# Patient Record
Sex: FEMALE | HISPANIC OR LATINO | URBAN - NONMETROPOLITAN AREA
[De-identification: names, ages, dates, MRNs, and addresses within clinical notes are randomized per-mention and may not be internally consistent; named-entity substitution may affect disease eponyms.]

---

## 2023-08-21 ENCOUNTER — OFFICE VISIT (OUTPATIENT)
Dept: OBGYN CLINIC | Facility: CLINIC | Age: 33
End: 2023-08-21
Payer: COMMERCIAL

## 2023-08-21 ENCOUNTER — HOSPITAL ENCOUNTER (OUTPATIENT)
Dept: ULTRASOUND IMAGING | Facility: HOSPITAL | Age: 33
Discharge: HOME | End: 2023-08-21
Attending: OBSTETRICS & GYNECOLOGY
Payer: COMMERCIAL

## 2023-08-21 VITALS
SYSTOLIC BLOOD PRESSURE: 118 MMHG | DIASTOLIC BLOOD PRESSURE: 72 MMHG | HEIGHT: 62 IN | WEIGHT: 150 LBS | BODY MASS INDEX: 27.6 KG/M2

## 2023-08-21 DIAGNOSIS — Z97.5 IUD (INTRAUTERINE DEVICE) IN PLACE: ICD-10-CM

## 2023-08-21 DIAGNOSIS — Z01.419 ENCOUNTER FOR GYNECOLOGICAL EXAMINATION: Primary | ICD-10-CM

## 2023-08-21 DIAGNOSIS — N92.6 IRREGULAR BLEEDING: ICD-10-CM

## 2023-08-21 LAB — SL AMB POCT URINE HCG: NEGATIVE

## 2023-08-21 PROCEDURE — 99385 PREV VISIT NEW AGE 18-39: CPT | Performed by: OBSTETRICS & GYNECOLOGY

## 2023-08-21 PROCEDURE — G0145 SCR C/V CYTO,THINLAYER,RESCR: HCPCS | Performed by: OBSTETRICS & GYNECOLOGY

## 2023-08-21 PROCEDURE — G0476 HPV COMBO ASSAY CA SCREEN: HCPCS | Performed by: OBSTETRICS & GYNECOLOGY

## 2023-08-21 PROCEDURE — 81025 URINE PREGNANCY TEST: CPT | Performed by: OBSTETRICS & GYNECOLOGY

## 2023-08-21 PROCEDURE — 76856 US EXAM PELVIC COMPLETE: CPT

## 2023-08-21 PROCEDURE — 76830 TRANSVAGINAL US NON-OB: CPT

## 2023-08-21 RX ORDER — HYDROXYZINE HYDROCHLORIDE 25 MG/1
TABLET, FILM COATED ORAL
COMMUNITY
Start: 2023-06-25

## 2023-08-21 RX ORDER — PREDNISONE 10 MG/1
TABLET ORAL
COMMUNITY
Start: 2023-06-25

## 2023-08-21 RX ORDER — TRIAMCINOLONE ACETONIDE 5 MG/G
1 CREAM TOPICAL 2 TIMES DAILY
COMMUNITY
Start: 2023-06-30

## 2023-08-21 NOTE — PROGRESS NOTES
ASSESSMENT & PLAN: Sumanth Valentin is a 28 y.o. [id number] with normal gynecologic exam.    1.  Routine well woman exam done today  2. Pap and HPV:  The patient's last pap and hpv was one year ago in   . It was normal.    Pap and cotesting was done today. Current ASCCP Guidelines reviewed. - no records from     3. The following were reviewed in today's visit: breast self exam, family planning choices, exercise and healthy diet. 4.PAragard IUD - placed in  3 years ago , noticing irregular cycles and bleeding     CC: Annual Gynecologic Examination    HPI: Sumanth Valentin is a 28 y.o. [id number] who presents for annual gynecologic examination. She has the following concerns:  IUD in place for  3 years / has seen her cycles are mostly spotting at irregular times and other times has a normal cycle     Health Maintenance:    She wears her seatbelt routinely. She does perform regular monthly self breast exams. She feels safe at home. History reviewed. No pertinent past medical history. Past Surgical History:   Procedure Laterality Date   • ABDOMINOPLASTY     • BUTTOCK LIFT     •  SECTION         Past OB/Gyn History:  OB History        3    Para   3    Term   3            AB        Living   3       SAB        IAB        Ectopic        Multiple        Live Births   3                   History reviewed. No pertinent family history. Social History:  Social History     Socioeconomic History   • Marital status: Single     Spouse name: Not on file   • Number of children: Not on file   • Years of education: Not on file   • Highest education level: Not on file   Occupational History   • Not on file   Tobacco Use   • Smoking status: Never   • Smokeless tobacco: Never   Vaping Use   • Vaping Use: Never used   Substance and Sexual Activity   • Alcohol use: Yes     Comment: occ   • Drug use: Never   • Sexual activity: Yes     Partners: Male     Birth control/protection: I.U.D.    Other Topics Concern   • Not on file   Social History Narrative   • Not on file     Social Determinants of Health     Financial Resource Strain: Not on file   Food Insecurity: Not on file   Transportation Needs: Not on file   Physical Activity: Not on file   Stress: Not on file   Social Connections: Not on file   Intimate Partner Violence: Not on file   Housing Stability: Not on file       No Known Allergies      Current Outpatient Medications:   •  hydrOXYzine HCL (ATARAX) 25 mg tablet, take 1 tablet by mouth three times a day if needed for itching, Disp: , Rfl:   •  predniSONE 10 mg tablet, TAKE 5 TABLETS BY MOUTH DAILY FOR 2 days THEN TAKE 4 TABLETS BY M...  (REFER TO PRESCRIPTION NOTES). , Disp: , Rfl:   •  triamcinolone (KENALOG) 0.5 % cream, Apply 1 Application topically 2 (two) times a day To affected area, Disp: , Rfl:       Review of Systems  Constitutional :no fever, feels well, no tiredness, no recent weight gain or loss  ENT: no ear ache, no loss of hearing, no nosebleeds or nasal discharge, no sore throat or hoarseness. Cardiovascular: no complaints of slow or fast heart beat, no chest pain, no palpitations, no leg claudication or lower extremity edema. Respiratory: no complaints of shortness of shortness of breath, no VELAZQUEZ  Breasts:no complaints of breast pain, breast lump, or nipple discharge  Gastrointestinal: no complaints of abdominal pain, constipation, nausea, vomiting, or diarrhea or bloody stools  Genitourinary : no complaints of dysuria, incontinence, pelvic pain, no dysmenorrhea, vaginal discharge or abnormal vaginal bleeding and as noted in HPI. Musculoskeletal: no complaints of arthralgia, no myalgia, no joint swelling or stiffness, no limb pain or swelling.   Integumentary: no complaints of skin rash or lesion, itching or dry skin  Neurological: no complaints of headache, no confusion, no numbness or tingling, no dizziness or fainting    Objective      /72   Ht 5' 2" (1.575 m)   Wt 68 kg (150 lb)   LMP  (LMP Unknown)   BMI 27.44 kg/m²   General:   appears stated age, cooperative, alert normal mood and affect   Lungs: Unlabored breathing    Breasts: normal appearance, no masses or tenderness   Abdomen: soft, non-tender, without masses or organomegaly   Vulva: normal   Vagina: normal vagina, no discharge, exudate, lesion, or erythema   Urethra: normal   Cervix: Normal, no discharge. IUD strings present.    Uterus: normal size, contour, position, consistency, mobility, non-tender   Adnexa: no mass, fullness, tenderness   Psychiatric orientation to person, place, and time: normal. mood and affect: normal

## 2023-08-22 LAB
HPV HR 12 DNA CVX QL NAA+PROBE: NEGATIVE
HPV16 DNA CVX QL NAA+PROBE: NEGATIVE
HPV18 DNA CVX QL NAA+PROBE: NEGATIVE

## 2023-08-25 LAB
LAB AP GYN PRIMARY INTERPRETATION: NORMAL
Lab: NORMAL

## 2023-08-30 NOTE — RESULT ENCOUNTER NOTE
Please inform patient  I reviewed her US   Showing IUD in place   She does have a hemorraghic cyst - will need repeat US in 8 weeks   These usually resolve on their own with no need for surgical intervention unless having worsening pain

## 2023-08-31 ENCOUNTER — TELEPHONE (OUTPATIENT)
Dept: OBGYN CLINIC | Facility: MEDICAL CENTER | Age: 33
End: 2023-08-31

## 2023-08-31 DIAGNOSIS — N83.209 HEMORRHAGIC CYST OF OVARY: Primary | ICD-10-CM

## 2023-10-16 ENCOUNTER — HOSPITAL ENCOUNTER (OUTPATIENT)
Dept: ULTRASOUND IMAGING | Facility: HOSPITAL | Age: 33
Discharge: HOME | End: 2023-10-16
Payer: COMMERCIAL

## 2023-10-16 DIAGNOSIS — N83.209 HEMORRHAGIC CYST OF OVARY: ICD-10-CM

## 2023-10-16 PROCEDURE — 76856 US EXAM PELVIC COMPLETE: CPT

## 2023-10-16 PROCEDURE — 76830 TRANSVAGINAL US NON-OB: CPT

## 2023-10-26 NOTE — RESULT ENCOUNTER NOTE
Please inform patient IUD in place   Resolution of hemorrhagic cyst   Presence  thick walled right ovarian cyst - which is a corpus luteum cyst (cyst of ovulation) this is benign and will likely resolve spontaneously